# Patient Record
Sex: FEMALE | Race: ASIAN | NOT HISPANIC OR LATINO | URBAN - METROPOLITAN AREA
[De-identification: names, ages, dates, MRNs, and addresses within clinical notes are randomized per-mention and may not be internally consistent; named-entity substitution may affect disease eponyms.]

---

## 2019-06-15 ENCOUNTER — EMERGENCY (EMERGENCY)
Facility: HOSPITAL | Age: 39
LOS: 1 days | Discharge: HOME | End: 2019-06-15
Attending: EMERGENCY MEDICINE | Admitting: EMERGENCY MEDICINE
Payer: MEDICAID

## 2019-06-15 VITALS
DIASTOLIC BLOOD PRESSURE: 58 MMHG | SYSTOLIC BLOOD PRESSURE: 123 MMHG | TEMPERATURE: 98 F | OXYGEN SATURATION: 100 % | RESPIRATION RATE: 20 BRPM | HEART RATE: 85 BPM

## 2019-06-15 VITALS
OXYGEN SATURATION: 100 % | DIASTOLIC BLOOD PRESSURE: 59 MMHG | SYSTOLIC BLOOD PRESSURE: 99 MMHG | HEART RATE: 76 BPM | RESPIRATION RATE: 18 BRPM

## 2019-06-15 LAB
ALBUMIN SERPL ELPH-MCNC: 4.4 G/DL — SIGNIFICANT CHANGE UP (ref 3.3–5)
ALP SERPL-CCNC: 90 U/L — SIGNIFICANT CHANGE UP (ref 40–120)
ALT FLD-CCNC: 62 U/L — HIGH (ref 4–33)
ANION GAP SERPL CALC-SCNC: 14 MMO/L — SIGNIFICANT CHANGE UP (ref 7–14)
APPEARANCE UR: CLEAR — SIGNIFICANT CHANGE UP
APTT BLD: 28 SEC — SIGNIFICANT CHANGE UP (ref 27.5–36.3)
AST SERPL-CCNC: 98 U/L — HIGH (ref 4–32)
BASOPHILS # BLD AUTO: 0.04 K/UL — SIGNIFICANT CHANGE UP (ref 0–0.2)
BASOPHILS NFR BLD AUTO: 0.3 % — SIGNIFICANT CHANGE UP (ref 0–2)
BILIRUB SERPL-MCNC: 1.7 MG/DL — HIGH (ref 0.2–1.2)
BILIRUB UR-MCNC: NEGATIVE — SIGNIFICANT CHANGE UP
BLOOD UR QL VISUAL: NEGATIVE — SIGNIFICANT CHANGE UP
BUN SERPL-MCNC: 20 MG/DL — SIGNIFICANT CHANGE UP (ref 7–23)
CALCIUM SERPL-MCNC: 9.7 MG/DL — SIGNIFICANT CHANGE UP (ref 8.4–10.5)
CHLORIDE SERPL-SCNC: 102 MMOL/L — SIGNIFICANT CHANGE UP (ref 98–107)
CO2 SERPL-SCNC: 23 MMOL/L — SIGNIFICANT CHANGE UP (ref 22–31)
COLOR SPEC: YELLOW — SIGNIFICANT CHANGE UP
CREAT SERPL-MCNC: 0.69 MG/DL — SIGNIFICANT CHANGE UP (ref 0.5–1.3)
EOSINOPHIL # BLD AUTO: 0.14 K/UL — SIGNIFICANT CHANGE UP (ref 0–0.5)
EOSINOPHIL NFR BLD AUTO: 1.1 % — SIGNIFICANT CHANGE UP (ref 0–6)
GLUCOSE SERPL-MCNC: 109 MG/DL — HIGH (ref 70–99)
GLUCOSE UR-MCNC: NEGATIVE — SIGNIFICANT CHANGE UP
HCT VFR BLD CALC: 34.5 % — SIGNIFICANT CHANGE UP (ref 34.5–45)
HGB BLD-MCNC: 10.8 G/DL — LOW (ref 11.5–15.5)
IMM GRANULOCYTES NFR BLD AUTO: 0.5 % — SIGNIFICANT CHANGE UP (ref 0–1.5)
INR BLD: 1.69 — HIGH (ref 0.88–1.17)
KETONES UR-MCNC: NEGATIVE — SIGNIFICANT CHANGE UP
LEUKOCYTE ESTERASE UR-ACNC: NEGATIVE — SIGNIFICANT CHANGE UP
LIDOCAIN IGE QN: 40.1 U/L — SIGNIFICANT CHANGE UP (ref 7–60)
LYMPHOCYTES # BLD AUTO: 16.3 % — SIGNIFICANT CHANGE UP (ref 13–44)
LYMPHOCYTES # BLD AUTO: 2.11 K/UL — SIGNIFICANT CHANGE UP (ref 1–3.3)
MCHC RBC-ENTMCNC: 20 PG — LOW (ref 27–34)
MCHC RBC-ENTMCNC: 31.3 % — LOW (ref 32–36)
MCV RBC AUTO: 63.9 FL — LOW (ref 80–100)
MONOCYTES # BLD AUTO: 0.66 K/UL — SIGNIFICANT CHANGE UP (ref 0–0.9)
MONOCYTES NFR BLD AUTO: 5.1 % — SIGNIFICANT CHANGE UP (ref 2–14)
NEUTROPHILS # BLD AUTO: 9.95 K/UL — HIGH (ref 1.8–7.4)
NEUTROPHILS NFR BLD AUTO: 76.7 % — SIGNIFICANT CHANGE UP (ref 43–77)
NITRITE UR-MCNC: NEGATIVE — SIGNIFICANT CHANGE UP
NRBC # FLD: 0 K/UL — SIGNIFICANT CHANGE UP (ref 0–0)
PH UR: 7 — SIGNIFICANT CHANGE UP (ref 5–8)
PLATELET # BLD AUTO: 215 K/UL — SIGNIFICANT CHANGE UP (ref 150–400)
PMV BLD: SIGNIFICANT CHANGE UP FL (ref 7–13)
POTASSIUM SERPL-MCNC: 4.1 MMOL/L — SIGNIFICANT CHANGE UP (ref 3.5–5.3)
POTASSIUM SERPL-SCNC: 4.1 MMOL/L — SIGNIFICANT CHANGE UP (ref 3.5–5.3)
PROT SERPL-MCNC: 7.6 G/DL — SIGNIFICANT CHANGE UP (ref 6–8.3)
PROT UR-MCNC: NEGATIVE — SIGNIFICANT CHANGE UP
PROTHROM AB SERPL-ACNC: 19.1 SEC — HIGH (ref 9.8–13.1)
RBC # BLD: 5.4 M/UL — HIGH (ref 3.8–5.2)
RBC # FLD: 16.7 % — HIGH (ref 10.3–14.5)
SODIUM SERPL-SCNC: 139 MMOL/L — SIGNIFICANT CHANGE UP (ref 135–145)
SP GR SPEC: 1.02 — SIGNIFICANT CHANGE UP (ref 1–1.04)
UROBILINOGEN FLD QL: NORMAL — SIGNIFICANT CHANGE UP
WBC # BLD: 12.96 K/UL — HIGH (ref 3.8–10.5)
WBC # FLD AUTO: 12.96 K/UL — HIGH (ref 3.8–10.5)

## 2019-06-15 PROCEDURE — 76705 ECHO EXAM OF ABDOMEN: CPT | Mod: 26

## 2019-06-15 PROCEDURE — 74177 CT ABD & PELVIS W/CONTRAST: CPT | Mod: 26

## 2019-06-15 PROCEDURE — 93975 VASCULAR STUDY: CPT | Mod: 26

## 2019-06-15 PROCEDURE — 99285 EMERGENCY DEPT VISIT HI MDM: CPT | Mod: 25

## 2019-06-15 PROCEDURE — 76830 TRANSVAGINAL US NON-OB: CPT | Mod: 26

## 2019-06-15 RX ORDER — MORPHINE SULFATE 50 MG/1
4 CAPSULE, EXTENDED RELEASE ORAL ONCE
Refills: 0 | Status: DISCONTINUED | OUTPATIENT
Start: 2019-06-15 | End: 2019-06-15

## 2019-06-15 RX ORDER — SODIUM CHLORIDE 9 MG/ML
1000 INJECTION INTRAMUSCULAR; INTRAVENOUS; SUBCUTANEOUS ONCE
Refills: 0 | Status: COMPLETED | OUTPATIENT
Start: 2019-06-15 | End: 2019-06-15

## 2019-06-15 RX ORDER — KETOROLAC TROMETHAMINE 30 MG/ML
15 SYRINGE (ML) INJECTION ONCE
Refills: 0 | Status: DISCONTINUED | OUTPATIENT
Start: 2019-06-15 | End: 2019-06-15

## 2019-06-15 RX ORDER — ONDANSETRON 8 MG/1
4 TABLET, FILM COATED ORAL ONCE
Refills: 0 | Status: COMPLETED | OUTPATIENT
Start: 2019-06-15 | End: 2019-06-15

## 2019-06-15 RX ORDER — DIPHENHYDRAMINE HCL 50 MG
50 CAPSULE ORAL ONCE
Refills: 0 | Status: COMPLETED | OUTPATIENT
Start: 2019-06-15 | End: 2019-06-15

## 2019-06-15 RX ADMIN — SODIUM CHLORIDE 1000 MILLILITER(S): 9 INJECTION INTRAMUSCULAR; INTRAVENOUS; SUBCUTANEOUS at 05:16

## 2019-06-15 RX ADMIN — ONDANSETRON 4 MILLIGRAM(S): 8 TABLET, FILM COATED ORAL at 05:16

## 2019-06-15 RX ADMIN — Medication 15 MILLIGRAM(S): at 06:46

## 2019-06-15 RX ADMIN — MORPHINE SULFATE 4 MILLIGRAM(S): 50 CAPSULE, EXTENDED RELEASE ORAL at 05:14

## 2019-06-15 RX ADMIN — Medication 50 MILLIGRAM(S): at 05:14

## 2019-06-15 NOTE — ED PROVIDER NOTE - CONSTITUTIONAL, MLM
normal... Uncomfortable appearing, well nourished, awake, alert, oriented to person, place, time/situation and in mild distress secondary to pain.

## 2019-06-15 NOTE — ED ADULT TRIAGE NOTE - CHIEF COMPLAINT QUOTE
alert c/o severe diffuse abdomen pain   with N/V  started 30 min ago  very uncomfortable in triage   no med hx

## 2019-06-15 NOTE — ED PROVIDER NOTE - PROGRESS NOTE DETAILS
Shirley: patient received morphine for pain, had an immediate reaction to skin with redness noted. no airway involvement. benadryl iv given. no hx of allergy to morphine or similar. will add to allergy list and monitor patient. patient tolerated PO, labs only sign for Bili of 1.7 and slight AST/ALT elevation. Currently pain free and without any other signs of Acute doreen and normal CBD w/out evidence of choledocho. Appendix not visualized on CT but currently no symptoms or tenderness on abdominal exam. Will discharge with Surgery follow-up for eval for outpatient Cholecystectomy.   -Pop Martin PGY4 EMIM [facility]#[id number]

## 2019-06-15 NOTE — ED PROVIDER NOTE - NSFOLLOWUPINSTRUCTIONS_ED_ALL_ED_FT
You were seen at [facility] ER for abdominal pain. You had lab work with slightly elevated liver function and slight anemia but otherwise was normal. You had an ultrasound showing gall stones but no evidence of a stuck stone or acute infection of the gall bladder. You also had a normal CT scan of the abdomen.     You are to follow-up with a Surgeon in the next 2-5 days for further discussion and evaluation of likely outpatient removal of you gallbladder.     You should also see you primary care doctor in the next week for repeat lab work to check your liver function.     You should return for worsening or return of worsening pain, persistent vomiting and inability to tolerate liquids, persistent fevers of greater than 100.3F taken at home, vomiting blood or any other concerns.

## 2019-06-15 NOTE — ED PROVIDER NOTE - ATTENDING CONTRIBUTION TO CARE
Dr. Mims:  I have personally performed a face to face bedside history and physical examination of this patient. I have discussed the history, examination, review of systems, assessment and plan of management with the resident. I have reviewed the electronic medical record and amended it to reflect my history, review of systems, physical exam, assessment and plan.    38F denies pmh presents with abdominal pain that started around 4am.  Pain is mostly upper, with associated N/V.  Denies fever/chills, cp, sob, diarrhea, urinary symptoms, recent travel, sick contacts.    Exam:  - nad  - rrr  - ctab  - abd soft, TTP upper quadrants and LLQ    A/P  - abd pain, consider cholecystitis, diverticulitis, r/o ovarian torsion  - cbc, cmp, lipase, ua, urine culture, TVUS, CT abd/pelvis, RUQ US

## 2019-06-15 NOTE — ED PROVIDER NOTE - NSFOLLOWUPCLINICS_GEN_ALL_ED_FT
[facility]  General Surgery  [address] [place] [address] - [address] [place] [address]  Phone: [phone]  Fax:   Follow Up Time:

## 2019-06-15 NOTE — ED ADULT NURSE REASSESSMENT NOTE - NS ED NURSE REASSESS COMMENT FT1
Approx 0514: Pt noted to have rash at IV site immediately after morphine IV push given. Pt reports NKA allergy to morphine. Pt denies any sob, cp, dizziness. Pt breathing even and unlabored, MD Wright aware, pt medicated with IV benadryl IVpush immediately. Pt  at bedside, NSR on cardiac monitor, call bell within reach, will continue to monitor.
Received report from RUSTY Perez. Pt appears in NAD at this time, breathing is even and unlabored. Pt presenting with positive ROM in upper and lower extremities. Pt denies dizziness, lightheadedness, CP, SOB, abdominal pain, N/V at this time. VSS and as noted, MD at bedside, will continue to monitor.

## 2019-06-15 NOTE — ED PROVIDER NOTE - OBJECTIVE STATEMENT
38F with no PMHx p/w abdominal pain that started at about 430am. patient states pain is diffuse throughout her abdomen, severe rated at 9/10. (+) vomiting and nausea. no fevers, diarrhea, sick contacts, recent travel.

## 2019-06-15 NOTE — ED ADULT NURSE NOTE - OBJECTIVE STATEMENT
Pt received in rm 6, 38Y Female, Aox3, ambulatory, c/o diffuse abd pain x30 mins waking her up. Pt denies any current medical hx, current medications or NKA. Pt reports x1 episode of vomiting and nausea. Upon arrival to room pt appears uncomfortable, thrashing in stretcher, pt reporting pain 10/10. Pt denies any sob, cp, dizziness. MD at bedside, IV access obtained 20G Rt AC, labs sent medicated as ordered, VS as charted,  at bedside, will continue to monitor. Approx 0514: Pt received in rm 6, 38Y Female, Aox3, ambulatory, c/o diffuse abd pain x30 mins waking her up. Pt denies any current medical hx, current medications or NKA. Pt reports x1 episode of vomiting and nausea. Upon arrival to room pt appears uncomfortable, thrashing in stretcher, pt reporting pain 10/10. Pt denies any sob, cp, dizziness. MD at bedside, IV access obtained 20G Rt AC, labs sent medicated as ordered, VS as charted,  at bedside, will continue to monitor.

## 2019-06-16 LAB
BACTERIA UR CULT: SIGNIFICANT CHANGE UP
SPECIMEN SOURCE: SIGNIFICANT CHANGE UP

## 2019-09-26 ENCOUNTER — APPOINTMENT (OUTPATIENT)
Dept: TRAUMA SURGERY | Facility: CLINIC | Age: 39
End: 2019-09-26
Payer: MEDICAID

## 2019-09-26 VITALS
WEIGHT: 160 LBS | HEART RATE: 108 BPM | SYSTOLIC BLOOD PRESSURE: 124 MMHG | TEMPERATURE: 98.4 F | DIASTOLIC BLOOD PRESSURE: 78 MMHG | BODY MASS INDEX: 27.31 KG/M2 | HEIGHT: 64 IN

## 2019-09-26 PROCEDURE — XXXXX: CPT

## 2020-03-03 ENCOUNTER — EMERGENCY (EMERGENCY)
Facility: HOSPITAL | Age: 40
LOS: 1 days | Discharge: HOME | End: 2020-03-03
Attending: EMERGENCY MEDICINE | Admitting: EMERGENCY MEDICINE
Payer: MEDICAID

## 2020-03-03 VITALS
TEMPERATURE: 98 F | SYSTOLIC BLOOD PRESSURE: 120 MMHG | HEART RATE: 87 BPM | DIASTOLIC BLOOD PRESSURE: 76 MMHG | OXYGEN SATURATION: 98 % | RESPIRATION RATE: 18 BRPM

## 2020-03-03 VITALS
TEMPERATURE: 98 F | OXYGEN SATURATION: 100 % | HEART RATE: 77 BPM | RESPIRATION RATE: 16 BRPM | SYSTOLIC BLOOD PRESSURE: 117 MMHG | DIASTOLIC BLOOD PRESSURE: 80 MMHG

## 2020-03-03 LAB
ALBUMIN SERPL ELPH-MCNC: 4.3 G/DL — SIGNIFICANT CHANGE UP (ref 3.3–5)
ALP SERPL-CCNC: 67 U/L — SIGNIFICANT CHANGE UP (ref 40–120)
ALT FLD-CCNC: 14 U/L — SIGNIFICANT CHANGE UP (ref 4–33)
ANION GAP SERPL CALC-SCNC: 13 MMO/L — SIGNIFICANT CHANGE UP (ref 7–14)
APTT BLD: 32.3 SEC — SIGNIFICANT CHANGE UP (ref 27.5–36.3)
AST SERPL-CCNC: 17 U/L — SIGNIFICANT CHANGE UP (ref 4–32)
BILIRUB SERPL-MCNC: 1 MG/DL — SIGNIFICANT CHANGE UP (ref 0.2–1.2)
BUN SERPL-MCNC: 9 MG/DL — SIGNIFICANT CHANGE UP (ref 7–23)
CALCIUM SERPL-MCNC: 9.5 MG/DL — SIGNIFICANT CHANGE UP (ref 8.4–10.5)
CHLORIDE SERPL-SCNC: 100 MMOL/L — SIGNIFICANT CHANGE UP (ref 98–107)
CO2 SERPL-SCNC: 24 MMOL/L — SIGNIFICANT CHANGE UP (ref 22–31)
CREAT SERPL-MCNC: 0.65 MG/DL — SIGNIFICANT CHANGE UP (ref 0.5–1.3)
GLUCOSE SERPL-MCNC: 91 MG/DL — SIGNIFICANT CHANGE UP (ref 70–99)
HCG UR-SCNC: NEGATIVE — SIGNIFICANT CHANGE UP
HCT VFR BLD CALC: 36 % — SIGNIFICANT CHANGE UP (ref 34.5–45)
HGB BLD-MCNC: 11.1 G/DL — LOW (ref 11.5–15.5)
INR BLD: 1.19 — HIGH (ref 0.88–1.17)
MCHC RBC-ENTMCNC: 19.9 PG — LOW (ref 27–34)
MCHC RBC-ENTMCNC: 30.8 % — LOW (ref 32–36)
MCV RBC AUTO: 64.5 FL — LOW (ref 80–100)
NRBC # FLD: 0 K/UL — SIGNIFICANT CHANGE UP (ref 0–0)
PLATELET # BLD AUTO: 257 K/UL — SIGNIFICANT CHANGE UP (ref 150–400)
PMV BLD: SIGNIFICANT CHANGE UP FL (ref 7–13)
POTASSIUM SERPL-MCNC: 4 MMOL/L — SIGNIFICANT CHANGE UP (ref 3.5–5.3)
POTASSIUM SERPL-SCNC: 4 MMOL/L — SIGNIFICANT CHANGE UP (ref 3.5–5.3)
PROT SERPL-MCNC: 8 G/DL — SIGNIFICANT CHANGE UP (ref 6–8.3)
PROTHROM AB SERPL-ACNC: 13.6 SEC — HIGH (ref 9.8–13.1)
RBC # BLD: 5.58 M/UL — HIGH (ref 3.8–5.2)
RBC # FLD: 15.9 % — HIGH (ref 10.3–14.5)
SODIUM SERPL-SCNC: 137 MMOL/L — SIGNIFICANT CHANGE UP (ref 135–145)
SP GR UR: 1.01 — SIGNIFICANT CHANGE UP (ref 1–1.04)
WBC # BLD: 9.55 K/UL — SIGNIFICANT CHANGE UP (ref 3.8–10.5)
WBC # FLD AUTO: 9.55 K/UL — SIGNIFICANT CHANGE UP (ref 3.8–10.5)

## 2020-03-03 PROCEDURE — 70487 CT MAXILLOFACIAL W/DYE: CPT | Mod: 26

## 2020-03-03 PROCEDURE — 99285 EMERGENCY DEPT VISIT HI MDM: CPT

## 2020-03-03 RX ORDER — AMPICILLIN SODIUM AND SULBACTAM SODIUM 250; 125 MG/ML; MG/ML
3 INJECTION, POWDER, FOR SUSPENSION INTRAMUSCULAR; INTRAVENOUS ONCE
Refills: 0 | Status: COMPLETED | OUTPATIENT
Start: 2020-03-03 | End: 2020-03-03

## 2020-03-03 RX ORDER — KETOROLAC TROMETHAMINE 30 MG/ML
15 SYRINGE (ML) INJECTION ONCE
Refills: 0 | Status: DISCONTINUED | OUTPATIENT
Start: 2020-03-03 | End: 2020-03-03

## 2020-03-03 RX ORDER — OXYCODONE HYDROCHLORIDE 5 MG/1
1 TABLET ORAL
Qty: 3 | Refills: 0
Start: 2020-03-03 | End: 2020-03-03

## 2020-03-03 RX ORDER — HYDROMORPHONE HYDROCHLORIDE 2 MG/ML
0.5 INJECTION INTRAMUSCULAR; INTRAVENOUS; SUBCUTANEOUS ONCE
Refills: 0 | Status: DISCONTINUED | OUTPATIENT
Start: 2020-03-03 | End: 2020-03-03

## 2020-03-03 RX ORDER — IBUPROFEN 200 MG
1 TABLET ORAL
Qty: 21 | Refills: 0
Start: 2020-03-03 | End: 2020-03-09

## 2020-03-03 RX ADMIN — HYDROMORPHONE HYDROCHLORIDE 0.5 MILLIGRAM(S): 2 INJECTION INTRAMUSCULAR; INTRAVENOUS; SUBCUTANEOUS at 20:30

## 2020-03-03 RX ADMIN — AMPICILLIN SODIUM AND SULBACTAM SODIUM 200 GRAM(S): 250; 125 INJECTION, POWDER, FOR SUSPENSION INTRAMUSCULAR; INTRAVENOUS at 20:31

## 2020-03-03 RX ADMIN — AMPICILLIN SODIUM AND SULBACTAM SODIUM 3 GRAM(S): 250; 125 INJECTION, POWDER, FOR SUSPENSION INTRAMUSCULAR; INTRAVENOUS at 21:10

## 2020-03-03 RX ADMIN — HYDROMORPHONE HYDROCHLORIDE 0.5 MILLIGRAM(S): 2 INJECTION INTRAMUSCULAR; INTRAVENOUS; SUBCUTANEOUS at 20:43

## 2020-03-03 RX ADMIN — Medication 15 MILLIGRAM(S): at 20:43

## 2020-03-03 RX ADMIN — Medication 15 MILLIGRAM(S): at 20:30

## 2020-03-03 NOTE — ED PROVIDER NOTE - PROGRESS NOTE DETAILS
KRISTI MARTINEZ - sign out received, dental cleared pt for dc with pain control, no abx needed, pt stable for dc

## 2020-03-03 NOTE — ED PROVIDER NOTE - PATIENT PORTAL LINK FT
You can access the FollowMyHealth Patient Portal offered by [facility] by registering at the following website: [email or web address]. By joining [employer]s FollowMyHealth portal, you will also be able to view your health information using other applications (apps) compatible with our system.

## 2020-03-03 NOTE — ED PROVIDER NOTE - OBJECTIVE STATEMENT
39 year old denies pmh presents with 3 days of left upper jaw pain and swelling. Started on amox 3 days ago by pmd. swelling and pain got worse. unable to tolerate solids or open mouth. no fever no neck pain. no voice change. taking ibuprofen with no relief. pain is constant severe and worsening. told by dentist to come to ed for oral surg eval

## 2020-03-03 NOTE — ED PROVIDER NOTE - CLINICAL SUMMARY MEDICAL DECISION MAKING FREE TEXT BOX
concern for left dental abscess vs pta. will start iv abx iv pain control ct with iv contrast to rule out abscess. likely omfs/dental consult pending ct

## 2020-03-03 NOTE — PROGRESS NOTE ADULT - SUBJECTIVE AND OBJECTIVE BOX
Patient is a 39y old Female who presents with her  due to dental pain.    CC: "My teeth in my upper left and lower left really hurt me."    HPI: Pt states for one week dental pain associated with #16, 17. Patient states pain is sharp and constant. Patient states she has been taking OTC Pain meds PRN For dental pain however provides minimal pain relief. Patient states she went to outside dentist a few days ago where a panoramic radiograph was taken and was advised to get #16, 17 extracted. Patient states she does not have an outside dentist and has not obtained comprehensive care for past few years, however was informed an appointment could be created for extractions in ~10 days from now. Patient denies any fevers, difficulty breathing, vomiting.     PAST MEDICAL & SURGICAL HISTORY:  No pertinent past medical history  No significant past surgical history    MEDICATIONS  (STANDING):  None    Allergies:  morphine (Rash)    FAMILY HISTORY:  No pertinent family history in first degree relatives    *Last Dental Visit: A few days ago    Vital Signs Last 24 Hrs  T(C): 36.7 (03 Mar 2020 20:20), Max: 36.7 (03 Mar 2020 17:29)  T(F): 98.1 (03 Mar 2020 20:20), Max: 98.1 (03 Mar 2020 20:20)  HR: 77 (03 Mar 2020 20:20) (77 - 87)  BP: 117/80 (03 Mar 2020 20:20) (117/80 - 120/76)  BP(mean): --  RR: 16 (03 Mar 2020 20:20) (16 - 18)  SpO2: 100% (03 Mar 2020 20:20) (98% - 100%)    EOE:  No swellings, no asymmetries, no LAD, no abnormalities noted.           TMJ ( -  ) clicks                    ( -   ) pops                    ( -   ) crepitus             Mandible: FROM             Facial bones and MOM: grossly intact             ( +  ) trismus: Mild trismus, assistance needed to open mandible             ( -  ) LAD             ( -  ) swelling             ( -  ) asymmetry             ( -  ) palpation             ( -  ) SOB             ( -  ) dysphagia             ( -  ) LOC    IOE:  Permanent dentition grossly intact. Multiple carious teeth noted. Multiple missing teeth. No swellings, no asymmetries, no active infections, no abnormalities noted.            hard/soft palate:  ( -  ) palatal torus           tongue/FOM: Unable to fully examine due to trismus and discomfort/pain with opening           labial/buccal mucosa: WNL. Bilateral lineal alba noted.           ( -  ) percussion           ( -  ) palpation           ( -  ) swelling   #16: Clinically missing, not tender to buccal/palatal palpation  #17: Partial soft tissue impaction, not tender to buccal/lingual palpation, not sensitive to percussion    LABS:                        11.1   9.55  )-----------( 257      ( 03 Mar 2020 19:18 )             36.0     03-03    137  |  100  |  9   ----------------------------<  91  4.0   |  24  |  0.65    Ca    9.5      03 Mar 2020 19:18    TPro  8.0  /  Alb  4.3  /  TBili  1.0  /  DBili  x   /  AST  17  /  ALT  14  /  AlkPhos  67  03-03    WBC Count: 9.55 K/uL [3.8 - 10.5] (03-03 @ 19:18)  Platelet Count - Automated: 257 K/uL [150 - 400] (03-03 @ 19:18)  INR: 1.19 <H> [0.88 - 1.17] (03-03 @ 19:18)    *DENTAL RADIOGRAPHS: None obtained. Discussed with pt option to obtain panoramic radiograph and PAs, however pt stated due to recent panoramic radiograph obtained at outside dentist, does not want to re-expose to radiation. Patient does not have copy of radiograph previously obtained at outside dentist at today's visit. Informed pt unable to fully evaluate with radiographs however pt continued to decline option to obtain today.     Informed pt unable to diagnose pain at this time due to lack of consent to complete exam. Advised pt to follow up with outside dentist for further evaluation and treatment of dental pain.    ASSESSMENT: Dental pain likely due to third molars    RECOMMENDATIONS:  1) Dental F/U with outpatient dentist for comprehensive dental care   2) OTC Pain meds PRN For dental pain  3) If any difficulty swallowing/breathing, fever occur, page dental.     Radha Darby DMD, #[id number]

## 2020-03-03 NOTE — ED ADULT TRIAGE NOTE - CHIEF COMPLAINT QUOTE
p/t c.o of lt sided dental pain for few days, p/.t c/o of pain radiating to jaw and throat sent by PMD for eval

## 2020-03-03 NOTE — ED PROVIDER NOTE - NSFOLLOWUPINSTRUCTIONS_ED_ALL_ED_FT
Follow up with your private dentist within 48-72 hrs or our clinic [phone].  Soft diet. Take Motrin 600mg every 8hrs with food for pain. Take over the counter extra strength tylenol as directed for pain. Take oxycodone 1 tab every 6hrs for breakthru pain- caution drowsiness- do not drive or operate heavy machinery.  Worsening pain, new facial swelling, fever, chills return to ER

## 2020-09-29 NOTE — ED ADULT NURSE NOTE - NS ED NOTE  TALK SOMEONE YN
Quality 130: Documentation Of Current Medications In The Medical Record: Current Medications Documented Detail Level: Detailed No

## 2021-04-08 ENCOUNTER — APPOINTMENT (OUTPATIENT)
Dept: ORTHOPEDIC SURGERY | Facility: CLINIC | Age: 41
End: 2021-04-08
Payer: MEDICAID

## 2021-04-08 VITALS
BODY MASS INDEX: 26.66 KG/M2 | WEIGHT: 160 LBS | HEART RATE: 82 BPM | SYSTOLIC BLOOD PRESSURE: 113 MMHG | DIASTOLIC BLOOD PRESSURE: 74 MMHG | HEIGHT: 65 IN

## 2021-04-08 DIAGNOSIS — M23.91 UNSPECIFIED INTERNAL DERANGEMENT OF RIGHT KNEE: ICD-10-CM

## 2021-04-08 PROCEDURE — 99072 ADDL SUPL MATRL&STAF TM PHE: CPT

## 2021-04-08 PROCEDURE — 99203 OFFICE O/P NEW LOW 30 MIN: CPT

## 2021-04-08 PROCEDURE — 73562 X-RAY EXAM OF KNEE 3: CPT | Mod: RT

## 2021-04-08 RX ORDER — DICLOFENAC SODIUM 100 MG/1
100 TABLET, FILM COATED, EXTENDED RELEASE ORAL
Qty: 30 | Refills: 0 | Status: ACTIVE | COMMUNITY
Start: 2021-04-08 | End: 1900-01-01

## 2021-04-22 ENCOUNTER — APPOINTMENT (OUTPATIENT)
Dept: ORTHOPEDIC SURGERY | Facility: CLINIC | Age: 41
End: 2021-04-22
Payer: MEDICAID

## 2021-04-22 PROCEDURE — 99213 OFFICE O/P EST LOW 20 MIN: CPT

## 2021-04-22 PROCEDURE — 99072 ADDL SUPL MATRL&STAF TM PHE: CPT

## 2021-04-22 RX ORDER — CELECOXIB 200 MG/1
200 CAPSULE ORAL DAILY
Qty: 30 | Refills: 0 | Status: ACTIVE | COMMUNITY
Start: 2021-04-22 | End: 1900-01-01

## 2021-05-27 ENCOUNTER — APPOINTMENT (OUTPATIENT)
Dept: ORTHOPEDIC SURGERY | Facility: CLINIC | Age: 41
End: 2021-05-27

## 2021-06-04 ENCOUNTER — APPOINTMENT (OUTPATIENT)
Dept: ORTHOPEDIC SURGERY | Facility: CLINIC | Age: 41
End: 2021-06-04
Payer: MEDICAID

## 2021-06-04 VITALS
SYSTOLIC BLOOD PRESSURE: 114 MMHG | BODY MASS INDEX: 26.66 KG/M2 | WEIGHT: 160 LBS | DIASTOLIC BLOOD PRESSURE: 75 MMHG | HEIGHT: 65 IN | HEART RATE: 66 BPM

## 2021-06-04 PROCEDURE — 99213 OFFICE O/P EST LOW 20 MIN: CPT

## 2021-06-04 RX ORDER — MELOXICAM 15 MG/1
15 TABLET ORAL DAILY
Qty: 30 | Refills: 1 | Status: ACTIVE | COMMUNITY
Start: 2021-06-04 | End: 1900-01-01

## 2021-07-07 ENCOUNTER — EMERGENCY (EMERGENCY)
Facility: HOSPITAL | Age: 41
LOS: 1 days | Discharge: HOME | End: 2021-07-07
Attending: EMERGENCY MEDICINE | Admitting: EMERGENCY MEDICINE
Payer: MEDICAID

## 2021-07-07 VITALS
HEART RATE: 80 BPM | TEMPERATURE: 98 F | RESPIRATION RATE: 16 BRPM | OXYGEN SATURATION: 100 % | DIASTOLIC BLOOD PRESSURE: 74 MMHG | HEIGHT: 64 IN | SYSTOLIC BLOOD PRESSURE: 107 MMHG

## 2021-07-07 LAB
ALBUMIN SERPL ELPH-MCNC: 4.2 G/DL — SIGNIFICANT CHANGE UP (ref 3.3–5)
ALP SERPL-CCNC: 98 U/L — SIGNIFICANT CHANGE UP (ref 40–120)
ALT FLD-CCNC: 130 U/L — HIGH (ref 4–33)
ANION GAP SERPL CALC-SCNC: 12 MMOL/L — SIGNIFICANT CHANGE UP (ref 7–14)
AST SERPL-CCNC: 194 U/L — HIGH (ref 4–32)
BASOPHILS # BLD AUTO: 0.03 K/UL — SIGNIFICANT CHANGE UP (ref 0–0.2)
BASOPHILS NFR BLD AUTO: 0.3 % — SIGNIFICANT CHANGE UP (ref 0–2)
BILIRUB SERPL-MCNC: 1.2 MG/DL — SIGNIFICANT CHANGE UP (ref 0.2–1.2)
BUN SERPL-MCNC: 11 MG/DL — SIGNIFICANT CHANGE UP (ref 7–23)
CALCIUM SERPL-MCNC: 9.3 MG/DL — SIGNIFICANT CHANGE UP (ref 8.4–10.5)
CHLORIDE SERPL-SCNC: 103 MMOL/L — SIGNIFICANT CHANGE UP (ref 98–107)
CO2 SERPL-SCNC: 24 MMOL/L — SIGNIFICANT CHANGE UP (ref 22–31)
CREAT SERPL-MCNC: 0.61 MG/DL — SIGNIFICANT CHANGE UP (ref 0.5–1.3)
EOSINOPHIL # BLD AUTO: 0.08 K/UL — SIGNIFICANT CHANGE UP (ref 0–0.5)
EOSINOPHIL NFR BLD AUTO: 0.7 % — SIGNIFICANT CHANGE UP (ref 0–6)
GLUCOSE SERPL-MCNC: 71 MG/DL — SIGNIFICANT CHANGE UP (ref 70–99)
HCT VFR BLD CALC: 32.9 % — LOW (ref 34.5–45)
HGB BLD-MCNC: 10.1 G/DL — LOW (ref 11.5–15.5)
IANC: 9.04 K/UL — HIGH (ref 1.5–8.5)
IMM GRANULOCYTES NFR BLD AUTO: 0.6 % — SIGNIFICANT CHANGE UP (ref 0–1.5)
LIDOCAIN IGE QN: 27 U/L — SIGNIFICANT CHANGE UP (ref 7–60)
LYMPHOCYTES # BLD AUTO: 1.43 K/UL — SIGNIFICANT CHANGE UP (ref 1–3.3)
LYMPHOCYTES # BLD AUTO: 12.5 % — LOW (ref 13–44)
MCHC RBC-ENTMCNC: 19.8 PG — LOW (ref 27–34)
MCHC RBC-ENTMCNC: 30.7 GM/DL — LOW (ref 32–36)
MCV RBC AUTO: 64.6 FL — LOW (ref 80–100)
MONOCYTES # BLD AUTO: 0.81 K/UL — SIGNIFICANT CHANGE UP (ref 0–0.9)
MONOCYTES NFR BLD AUTO: 7.1 % — SIGNIFICANT CHANGE UP (ref 2–14)
NEUTROPHILS # BLD AUTO: 9.04 K/UL — HIGH (ref 1.8–7.4)
NEUTROPHILS NFR BLD AUTO: 78.8 % — HIGH (ref 43–77)
NRBC # BLD: 0 /100 WBCS — SIGNIFICANT CHANGE UP
NRBC # FLD: 0 K/UL — SIGNIFICANT CHANGE UP
PLATELET # BLD AUTO: 232 K/UL — SIGNIFICANT CHANGE UP (ref 150–400)
POTASSIUM SERPL-MCNC: 3.7 MMOL/L — SIGNIFICANT CHANGE UP (ref 3.5–5.3)
POTASSIUM SERPL-SCNC: 3.7 MMOL/L — SIGNIFICANT CHANGE UP (ref 3.5–5.3)
PROT SERPL-MCNC: 7.8 G/DL — SIGNIFICANT CHANGE UP (ref 6–8.3)
RBC # BLD: 5.09 M/UL — SIGNIFICANT CHANGE UP (ref 3.8–5.2)
RBC # FLD: 15.8 % — HIGH (ref 10.3–14.5)
SODIUM SERPL-SCNC: 139 MMOL/L — SIGNIFICANT CHANGE UP (ref 135–145)
WBC # BLD: 11.46 K/UL — HIGH (ref 3.8–10.5)
WBC # FLD AUTO: 11.46 K/UL — HIGH (ref 3.8–10.5)

## 2021-07-07 PROCEDURE — 99284 EMERGENCY DEPT VISIT MOD MDM: CPT

## 2021-07-07 RX ORDER — SODIUM CHLORIDE 9 MG/ML
1000 INJECTION INTRAMUSCULAR; INTRAVENOUS; SUBCUTANEOUS ONCE
Refills: 0 | Status: COMPLETED | OUTPATIENT
Start: 2021-07-07 | End: 2021-07-07

## 2021-07-07 RX ORDER — FAMOTIDINE 10 MG/ML
20 INJECTION INTRAVENOUS ONCE
Refills: 0 | Status: COMPLETED | OUTPATIENT
Start: 2021-07-07 | End: 2021-07-07

## 2021-07-07 RX ADMIN — FAMOTIDINE 20 MILLIGRAM(S): 10 INJECTION INTRAVENOUS at 22:01

## 2021-07-07 RX ADMIN — Medication 30 MILLILITER(S): at 22:01

## 2021-07-07 RX ADMIN — SODIUM CHLORIDE 1000 MILLILITER(S): 9 INJECTION INTRAMUSCULAR; INTRAVENOUS; SUBCUTANEOUS at 22:00

## 2021-07-07 NOTE — ED PROVIDER NOTE - CLINICAL SUMMARY MEDICAL DECISION MAKING FREE TEXT BOX
at present no abdominal pain  will treat for PUD; IVF, maalox, PPI  check labs including lipase  recommend to stop NSAIDs

## 2021-07-07 NOTE — ED PROVIDER NOTE - NSFOLLOWUPINSTRUCTIONS_ED_ALL_ED_FT
As we discussed follow up with your Gastroenterologist  You should cut back/stop using Meloxicam and other anti-inflammatories    Return to ER for worsening pain, bleeding, fever, or any other symptoms in which you feel you require immediate care    I have attached your lab results from today

## 2021-07-07 NOTE — ED PROVIDER NOTE - PATIENT PORTAL LINK FT
You can access the FollowMyHealth Patient Portal offered by [facility] by registering at the following website: [email or web address]. By joining [employer]’s FollowMyHealth portal, you will also be able to view your health information using other applications (apps) compatible with our system.

## 2021-07-07 NOTE — ED ADULT NURSE NOTE - NSSEPSISSUSPECTED_ED_A_ED
Patient Instructions by Isaura Delgadillo NCMA at 05/25/17 03:51 PM     Author:  Isaura Delgadillo NCMA Service:  (none) Author Type:  Certified Medical Assistant     Filed:  05/25/17 03:55 PM Encounter Date:  5/25/2017 Status:  Signed     :  Isaura Delgadillo NCMA (Certified Medical Assistant)            PATIENT INFORMATION   Left side of nose was cauterized today  Start tomorrow use the plain saline nasal spray 2 puffs each nostril 4 times per day  No exercise for 1 week      Follow-Up  -- Make an appointment with ISELA Connelly in when needed  -- Please review the handout you received at this visit. If you have any questions, please feel free to contact us.  Nosebleed    WHAT IS A NOSEBLEED:   A nosebleed occurs when the membranes lining the inner nose are disturbed or irritated enough to cause abnormal bleeding.  The medical term for nosebleed is Epistaxis.    HOW IS IT TREATED:  Most nosebleeds are minor and respond to first aid. Follow these steps:  1. When your nose starts bleeding, sit up and lean forward to prevent blood from passing into your throat.  2. Pinch the nose firmly together between the thumb and first finger, just below the nasal bones and hold it for a full 10 minutes.    After the bleeding stops, use a saline nasal spray or saline nose drops to keep the nose moist.  These are found over the counter.  DO NOT blow your nose for 1 week after the bleeding stops.    HOW CAN I PREVENT A NOSEBLEED:  · To prevent dryness, keep your nasal septum well coated with ointment or a nasal spray.  · Avoid injury to the lining of the nose by picking, rubbing, or forceful blowing.  · Keep your home humidified, this will add moisture to the air.  · Use petroleum jelly, saline nasal spray or ointment recommended by your provider.  · DO NOT SMOKE.  · DO NOT TAKE HOT SHOWERS.  · DO NOT USE COCAINE.  · Avoid bending over, straining, and lifting heavy objects. No lifting anything heavier than a  gallon of milk.  Do not exercise vigorously for a few days after a nosebleed.    Additional Educational Resources:  For additional resources regarding your symptoms, diagnosis, or further health information, please visit the Health Resources section on [email or web address] or the Online Health Resources section in [facility].        Revision History        User Key Date/Time User Provider Type Action    > [N/A] 05/25/17 03:55 PM Isaura Delgadillo NCMA Certified Medical Assistant Sign             No

## 2021-07-07 NOTE — ED ADULT NURSE NOTE - OBJECTIVE STATEMENT
Pt received in intake spot 10B, A&Ox4, NAD.  c/o epigastric pain for the past 4 days, intermittent, denies any aggravating factors, resolves on its own.  Presently denies any pain, just wants to get checked.  Denies any N/V/D.  Denies any urinary symptoms.  Labs sent.  Medicated as per MD orders.  Will continue to monitor.

## 2021-07-07 NOTE — ED ADULT TRIAGE NOTE - CHIEF COMPLAINT QUOTE
pt c/o abdominal pain, n/v that began 3 hours ago. abdomen soft , non tender. pt denies chest pain, SOB, dysuria, fever. no past medical hx.

## 2021-07-07 NOTE — ED PROVIDER NOTE - OBJECTIVE STATEMENT
40 yr old female with recent hx of gallstones, followed up with surgery who stated not the gallbladder and referred to GI.   had endoscopy which showed stomach ulcer.   has been recently taking NSAIDs for knee and foot pain and today presents for epigastric pain described as burning, at times bandlike.  Denies fever, vomiting (did have 1 episode of spitting up today).   pain comes and goes but today lasted prolonged time but now resolved.

## 2021-07-21 ENCOUNTER — APPOINTMENT (OUTPATIENT)
Dept: ORTHOPEDIC SURGERY | Facility: CLINIC | Age: 41
End: 2021-07-21
Payer: MEDICAID

## 2021-07-21 VITALS — WEIGHT: 160 LBS | BODY MASS INDEX: 26.66 KG/M2 | HEIGHT: 65 IN

## 2021-07-21 DIAGNOSIS — M65.9 SYNOVITIS AND TENOSYNOVITIS, UNSPECIFIED: ICD-10-CM

## 2021-07-21 PROBLEM — K27.9 PEPTIC ULCER, SITE UNSPECIFIED, UNSPECIFIED AS ACUTE OR CHRONIC, WITHOUT HEMORRHAGE OR PERFORATION: Chronic | Status: ACTIVE | Noted: 2021-07-07

## 2021-07-21 PROBLEM — K80.20 CALCULUS OF GALLBLADDER WITHOUT CHOLECYSTITIS WITHOUT OBSTRUCTION: Chronic | Status: ACTIVE | Noted: 2021-07-07

## 2021-07-21 PROCEDURE — 20610 DRAIN/INJ JOINT/BURSA W/O US: CPT

## 2021-07-21 PROCEDURE — 99213 OFFICE O/P EST LOW 20 MIN: CPT | Mod: 25

## 2021-07-22 ENCOUNTER — NON-APPOINTMENT (OUTPATIENT)
Age: 41
End: 2021-07-22

## 2021-07-22 ENCOUNTER — APPOINTMENT (OUTPATIENT)
Dept: ORTHOPEDIC SURGERY | Facility: CLINIC | Age: 41
End: 2021-07-22
Payer: MEDICAID

## 2021-07-22 DIAGNOSIS — M67.88 OTHER SPECIFIED DISORDERS OF SYNOVIUM AND TENDON, OTHER SITE: ICD-10-CM

## 2021-07-22 DIAGNOSIS — M76.71 PERONEAL TENDINITIS, RIGHT LEG: ICD-10-CM

## 2021-07-22 DIAGNOSIS — M79.671 PAIN IN RIGHT FOOT: ICD-10-CM

## 2021-07-22 DIAGNOSIS — M79.672 PAIN IN RIGHT FOOT: ICD-10-CM

## 2021-07-22 DIAGNOSIS — M76.72 PERONEAL TENDINITIS, LEFT LEG: ICD-10-CM

## 2021-07-22 DIAGNOSIS — M77.8 OTHER ENTHESOPATHIES, NOT ELSEWHERE CLASSIFIED: ICD-10-CM

## 2021-07-22 PROCEDURE — 99214 OFFICE O/P EST MOD 30 MIN: CPT

## 2021-07-22 PROCEDURE — 73630 X-RAY EXAM OF FOOT: CPT | Mod: LT

## 2021-07-23 PROBLEM — M77.8 ENTHESOPATHY OF FOOT: Status: ACTIVE | Noted: 2021-07-23

## 2021-07-23 PROBLEM — M67.88 RIGHT PERONEAL TENDINOSIS: Status: ACTIVE | Noted: 2021-07-23

## 2021-07-23 PROBLEM — M67.88 LEFT PERONEAL TENDINOSIS: Status: ACTIVE | Noted: 2021-07-23

## 2021-08-31 ENCOUNTER — APPOINTMENT (OUTPATIENT)
Dept: ORTHOPEDIC SURGERY | Facility: CLINIC | Age: 41
End: 2021-08-31

## 2021-10-21 ENCOUNTER — APPOINTMENT (OUTPATIENT)
Dept: ORTHOPEDIC SURGERY | Facility: CLINIC | Age: 41
End: 2021-10-21
Payer: MEDICAID

## 2021-10-21 VITALS
WEIGHT: 160 LBS | HEIGHT: 65 IN | BODY MASS INDEX: 26.66 KG/M2 | DIASTOLIC BLOOD PRESSURE: 62 MMHG | HEART RATE: 72 BPM | SYSTOLIC BLOOD PRESSURE: 100 MMHG

## 2021-10-21 DIAGNOSIS — M23.8X1 OTHER INTERNAL DERANGEMENTS OF RIGHT KNEE: ICD-10-CM

## 2021-10-21 DIAGNOSIS — M25.561 PAIN IN RIGHT KNEE: ICD-10-CM

## 2021-10-21 PROCEDURE — 99212 OFFICE O/P EST SF 10 MIN: CPT

## 2022-09-27 ENCOUNTER — APPOINTMENT (OUTPATIENT)
Dept: ORTHOPEDIC SURGERY | Facility: CLINIC | Age: 42
End: 2022-09-27

## 2022-09-27 VITALS
DIASTOLIC BLOOD PRESSURE: 71 MMHG | WEIGHT: 160 LBS | SYSTOLIC BLOOD PRESSURE: 108 MMHG | BODY MASS INDEX: 27.31 KG/M2 | HEIGHT: 64 IN

## 2022-09-27 DIAGNOSIS — M62.89 OTHER SPECIFIED DISORDERS OF MUSCLE: ICD-10-CM

## 2022-09-27 DIAGNOSIS — M77.51 OTHER ENTHESOPATHY OF RT FOOT AND ANKLE: ICD-10-CM

## 2022-09-27 PROCEDURE — 99214 OFFICE O/P EST MOD 30 MIN: CPT

## 2022-09-27 PROCEDURE — 73630 X-RAY EXAM OF FOOT: CPT | Mod: RT

## 2022-09-27 RX ORDER — DICLOFENAC SODIUM 1% 10 MG/G
1 GEL TOPICAL
Qty: 1 | Refills: 2 | Status: ACTIVE | COMMUNITY
Start: 2022-09-27 | End: 1900-01-01

## 2022-10-15 ENCOUNTER — OUTPATIENT (OUTPATIENT)
Dept: OUTPATIENT SERVICES | Facility: HOSPITAL | Age: 42
LOS: 1 days | End: 2022-10-15
Payer: MEDICAID

## 2022-10-15 ENCOUNTER — APPOINTMENT (OUTPATIENT)
Dept: MRI IMAGING | Facility: IMAGING CENTER | Age: 42
End: 2022-10-15

## 2022-10-15 DIAGNOSIS — M77.51 OTHER ENTHESOPATHY OF RIGHT FOOT AND ANKLE: ICD-10-CM

## 2022-10-15 PROCEDURE — 73718 MRI LOWER EXTREMITY W/O DYE: CPT | Mod: 26,RT

## 2022-10-15 PROCEDURE — 73718 MRI LOWER EXTREMITY W/O DYE: CPT

## 2022-11-10 ENCOUNTER — NON-APPOINTMENT (OUTPATIENT)
Age: 42
End: 2022-11-10

## 2022-11-10 ENCOUNTER — APPOINTMENT (OUTPATIENT)
Dept: ORTHOPEDIC SURGERY | Facility: CLINIC | Age: 42
End: 2022-11-10

## 2022-11-25 ENCOUNTER — APPOINTMENT (OUTPATIENT)
Dept: ORTHOPEDIC SURGERY | Facility: CLINIC | Age: 42
End: 2022-11-25

## 2022-11-25 VITALS
DIASTOLIC BLOOD PRESSURE: 72 MMHG | HEIGHT: 64 IN | BODY MASS INDEX: 27.31 KG/M2 | SYSTOLIC BLOOD PRESSURE: 111 MMHG | HEART RATE: 66 BPM | OXYGEN SATURATION: 98 % | WEIGHT: 160 LBS

## 2022-11-25 DIAGNOSIS — M79.671 PAIN IN RIGHT FOOT: ICD-10-CM

## 2022-11-25 DIAGNOSIS — R93.7 ABNORMAL FINDINGS ON DIAGNOSTIC IMAGING OF OTHER PARTS OF MUSCULOSKELETAL SYSTEM: ICD-10-CM

## 2022-11-25 PROCEDURE — 99213 OFFICE O/P EST LOW 20 MIN: CPT

## 2022-12-05 PROBLEM — R93.7 BONE MARROW EDEMA: Status: ACTIVE | Noted: 2022-12-05

## 2022-12-05 PROBLEM — M79.671 RIGHT FOOT PAIN: Status: ACTIVE | Noted: 2022-12-05

## 2022-12-05 NOTE — HISTORY OF PRESENT ILLNESS
[Sneakers] : doug [FreeTextEntry1] : 42 year female returns for reevaluation of R lateral forefoot pain. Pt states the pain is on dorsolateral aspect of R foot which is getting worse. Pt still has intermittent numbness/tingling on R foot. Pt still limps with ambulation. She feels the change in her gait is beginning to affect her lower back. Denies additional musculoskeletal complaints referable to foot/ankle.  Denies fever / chills.  Denies history of gout. She [resents for MRI review. \par

## 2022-12-05 NOTE — DISCUSSION/SUMMARY
[de-identified] : Options reviewed, Cam boot immobilization and Cam boot ambulation, activity modification, HEP.  Return to office in 1 month / PRN, all questions answered.

## 2023-01-03 ENCOUNTER — APPOINTMENT (OUTPATIENT)
Dept: ORTHOPEDIC SURGERY | Facility: CLINIC | Age: 43
End: 2023-01-03

## 2023-07-20 NOTE — ED PROVIDER NOTE - CONSTITUTIONAL NEGATIVE STATEMENT, MLM
Pt arrives from home c/o R sided and periumbilical abdominal pain onset this am. Pt was seen for same on 7/12/23 and said it had resolved but same pain has returned. +n/v/d. States bentyl not relieving pain. Pt tearful in triage.   no fever and no chills.

## 2025-02-02 NOTE — ED ADULT NURSE NOTE - NSFALLRSKINDICATORS_ED_ALL_ED
no PAST MEDICAL HISTORY:  CAD (coronary artery disease)     History of basal cell carcinoma (BCC) excision     Parkinson disease